# Patient Record
Sex: FEMALE | Race: OTHER | NOT HISPANIC OR LATINO | URBAN - METROPOLITAN AREA
[De-identification: names, ages, dates, MRNs, and addresses within clinical notes are randomized per-mention and may not be internally consistent; named-entity substitution may affect disease eponyms.]

---

## 2021-01-25 ENCOUNTER — PREPPED CHART (OUTPATIENT)
Dept: URBAN - METROPOLITAN AREA CLINIC 24 | Facility: CLINIC | Age: 46
End: 2021-01-25

## 2022-03-23 ASSESSMENT — TONOMETRY
OD_IOP_MMHG: 14
OS_IOP_MMHG: 12

## 2022-03-29 ENCOUNTER — ESTABLISHED PATIENT (OUTPATIENT)
Dept: URBAN - METROPOLITAN AREA CLINIC 23 | Facility: CLINIC | Age: 47
End: 2022-03-29

## 2022-03-29 DIAGNOSIS — H52.13: ICD-10-CM

## 2022-03-29 DIAGNOSIS — Z01.00: ICD-10-CM

## 2022-03-29 PROCEDURE — 92014 COMPRE OPH EXAM EST PT 1/>: CPT

## 2022-03-29 PROCEDURE — 92310-1E ESTABLISHED CL PATIENT SPHERICAL SINGLE VISION SOFT LENS EVALUATION

## 2022-03-29 PROCEDURE — 92015 DETERMINE REFRACTIVE STATE: CPT

## 2022-03-29 ASSESSMENT — VISUAL ACUITY
OU_CC: 20/20
OD_CC: 20/20-2
OS_CC: 20/25-1
OU_CC: 20/20-1

## 2022-03-29 ASSESSMENT — TONOMETRY
OS_IOP_MMHG: 13
OD_IOP_MMHG: 12

## 2022-09-13 ENCOUNTER — ESTABLISHED PATIENT (OUTPATIENT)
Dept: URBAN - METROPOLITAN AREA CLINIC 52 | Facility: CLINIC | Age: 47
End: 2022-09-13

## 2022-09-13 DIAGNOSIS — G43.B0: ICD-10-CM

## 2022-09-13 PROCEDURE — 92012 INTRM OPH EXAM EST PATIENT: CPT

## 2022-09-13 ASSESSMENT — VISUAL ACUITY
OS_CC: 20/20-1
OD_CC: 20/20-1
OD_CC: 20/20
OS_CC: 20/20
OU_CC: 20/20
OU_CC: 20/20

## 2022-09-13 ASSESSMENT — TONOMETRY
OS_IOP_MMHG: 12
OD_IOP_MMHG: 12

## 2022-09-13 NOTE — PATIENT DISCUSSION
Discussed that it can be a change in sleep, stress or caffeine. Explained that if she is getting more than 3 a week that would warrant a neuro referral. Advised to keep a food and drink journal to monitor when she gets them what is occurring for her. Reassured her there are no ocular findings that would cause any other types of disturbances besides the ocular migraine itself. Reassured her there are no signs of a stroke.

## 2022-10-11 ENCOUNTER — EMERGENCY VISIT (OUTPATIENT)
Dept: URBAN - METROPOLITAN AREA CLINIC 52 | Facility: CLINIC | Age: 47
End: 2022-10-11

## 2022-10-11 DIAGNOSIS — S05.02XA: ICD-10-CM

## 2022-10-11 PROCEDURE — 92012 INTRM OPH EXAM EST PATIENT: CPT

## 2022-10-11 ASSESSMENT — VISUAL ACUITY
OS_CC: 20/30
OD_CC: 20/20

## 2022-10-11 NOTE — PATIENT DISCUSSION
Recommended that she stay out of contact lenses for another week as the skin is very fresh. She is able to discontinue Ciprofloxacin in 5 more days or when the bottle runs out, which ever comes first. She can stop the Ketorolac at this time. Will see her back as needed.

## 2023-10-10 ENCOUNTER — EMERGENCY VISIT (OUTPATIENT)
Dept: URBAN - METROPOLITAN AREA CLINIC 52 | Facility: CLINIC | Age: 48
End: 2023-10-10

## 2023-10-10 DIAGNOSIS — H11.152: ICD-10-CM

## 2023-10-10 DIAGNOSIS — H10.811: ICD-10-CM

## 2023-10-10 PROCEDURE — 92012 INTRM OPH EXAM EST PATIENT: CPT

## 2023-10-10 RX ORDER — PREDNISOLONE ACETATE 10 MG/ML
1 SUSPENSION/ DROPS OPHTHALMIC
Start: 2023-10-10

## 2023-10-10 ASSESSMENT — VISUAL ACUITY
OU_CC: 20/20
OD_CC: 20/30
OS_CC: 20/20

## 2023-10-10 ASSESSMENT — TONOMETRY
OD_IOP_MMHG: 16
OS_IOP_MMHG: 15